# Patient Record
(demographics unavailable — no encounter records)

---

## 2025-05-23 NOTE — IMAGING
[Right] : right fingers [The fracture is in acceptable alignment. There is progression in healing seen] : The fracture is in acceptable alignment. There is progression in healing seen

## 2025-05-23 NOTE — DISCUSSION/SUMMARY
[de-identified] : 11yo female presents with thumb proximal phalanx buckle fracture after fall on an extended thumb   Patient was seen at [facility], placed in thumb spica X-rays and diagnosis discussed with patient and patient's mom  Thumb spica applied  Use of cryotherapy discussed Motrin, tylenol for pain and inflammation  Out of gym and sports  Patient instructed to rtc within 7 days to see hand specialist Follow-up with physician within 7 days clearly communicated with patient and patient verbalized understanding

## 2025-05-23 NOTE — HISTORY OF PRESENT ILLNESS
[7] : 7 [Throbbing] : throbbing [] : yes [FreeTextEntry1] : R hand  [FreeTextEntry5] : Mother in exam room. Reports she fell down steps last night. Went to [facility]- x-rays done and thumb spice applied to patient. Experiencing throbbing pain.

## 2025-05-23 NOTE — HISTORY OF PRESENT ILLNESS
[7] : 7 [Throbbing] : throbbing [] : yes [FreeTextEntry1] : R hand  [FreeTextEntry5] : Mother in exam room. Reports she fell down steps last night. Went to [facility]- x-rays done and thumb spice applied to patient. Experiencing throbbing pain.

## 2025-05-23 NOTE — PHYSICAL EXAM
[Right] : right hand [1st Finger] : 1st finger [] : no tenderness over hand [1st] : 1st [Proximal Phalanx] : proximal phalanx [PIP Joint] : PIP joint [Middle Phalanx] : middle phalanx

## 2025-05-23 NOTE — DISCUSSION/SUMMARY
[de-identified] : 11yo female presents with thumb proximal phalanx buckle fracture after fall on an extended thumb   Patient was seen at [facility], placed in thumb spica X-rays and diagnosis discussed with patient and patient's mom  Thumb spica applied  Use of cryotherapy discussed Motrin, tylenol for pain and inflammation  Out of gym and sports  Patient instructed to rtc within 7 days to see hand specialist Follow-up with physician within 7 days clearly communicated with patient and patient verbalized understanding